# Patient Record
Sex: MALE | Race: WHITE | NOT HISPANIC OR LATINO | Employment: FULL TIME | URBAN - METROPOLITAN AREA
[De-identification: names, ages, dates, MRNs, and addresses within clinical notes are randomized per-mention and may not be internally consistent; named-entity substitution may affect disease eponyms.]

---

## 2017-01-04 ENCOUNTER — CLINICAL SUPPORT (OUTPATIENT)
Dept: REHABILITATION | Facility: HOSPITAL | Age: 57
End: 2017-01-04
Attending: ORTHOPAEDIC SURGERY
Payer: COMMERCIAL

## 2017-01-04 DIAGNOSIS — G25.9 FUNCTIONAL MOVEMENT DISORDER: ICD-10-CM

## 2017-01-04 PROCEDURE — 97530 THERAPEUTIC ACTIVITIES: CPT | Performed by: PHYSICAL THERAPIST

## 2017-01-04 NOTE — PROGRESS NOTES
"Visit 3/  Name: Nishant Page  [facility] Number: [id number]  Date of Treatment: 2017   Diagnosis:   Encounter Diagnosis   Name Primary?    Functional movement disorder    Diagnosis:   S/p L knee menisectomy/chondroplasty  Date of Onset:   2.5 month hx  Date of Surgery:   2016  Time in:  8:00  Time Out:  9:00  Total Treatment Time:  60'  Group Time: NA  Subjective:  Nishant reports continued improvement in his L knee condition, however, has been unable to return to higher level activity at this time including skiing, jogging, and basketball .  Patient reports their pain to be 0/10 on a 0-10 scale with 0 being no pain and 10 being the worst pain imaginable.  Objective  PO 28  Neg effusion L knee   Patient participated in dynamic functional therapeutic activities to improve functional performance for  60 minutes one on one with PT. Including:   U squat + 1 black sport cord 3x1' R/L  Lat hops + 2 black sport cord 3x:30 R/L  FW hops + 2 black sport cords 2x1'   BW "   "    CP x 10'   Written Home Exercises Provided:  Updated for walk/jog program with 180 heidi and mid foot strike  Pt demo good understanding of the education provided. Nishant demonstrated good return demonstration of activities.   Assessment:   Decreased movement patterning L knee/LE and decreased endurance which pt requires for ADLs and higher level activities   Good training effect in quad/hip/calf musculature, no c/o L knee pain, still limited in full function  Pt will continue to benefit from skilled PT intervention. Medical Necessity is demonstrated by:  Unable to participate fully in daily activities.  Patient is making good progress towards established goals.  New/Revised goals:   STG:  3-4 weeks (achieved)  Decrease effusion to Trace   Increase ROM 50-75%  Increase MMT 50-75%  LT-8 weeks   Decrease c/o pain with activity 100% (achieved)  Decrease effusion to 0 (achieved)  Restore 100% full ROM (achieved)  Restore 100% full MMT  Return to " 100% preinjury level of function (see functional limitations)  Plan:  Continue with established Plan of Care towards PT goals X 1 visit and DC to HEP if appropriate   Sports Test NV if appropriate.   Ricardo Rodriguez, PT  1/4/2017

## 2017-01-17 ENCOUNTER — CLINICAL SUPPORT (OUTPATIENT)
Dept: REHABILITATION | Facility: HOSPITAL | Age: 57
End: 2017-01-17
Attending: ORTHOPAEDIC SURGERY
Payer: COMMERCIAL

## 2017-01-17 DIAGNOSIS — G25.9 FUNCTIONAL MOVEMENT DISORDER: ICD-10-CM

## 2017-01-17 PROCEDURE — 97530 THERAPEUTIC ACTIVITIES: CPT | Performed by: PHYSICAL THERAPIST

## 2017-01-17 NOTE — PROGRESS NOTES
Visit 4/  Name: Nishant Page  [facility] Number: [id number]  Date of Treatment: 2017   Diagnosis:   Encounter Diagnosis   Name Primary?    Functional movement disorder    Diagnosis:   S/p L knee menisectomy/chondroplasty  Date of Onset:   2.5 month hx  Date of Surgery:   2016  Time in:  7:00  Time Out:  8:00  Total Treatment Time:  60'  Group Time: NA  Subjective:  Nishant reports that he has been doing some plyometric jumping activities at PT in [place] with minimal soreness medially in L knee, overall doing well, reports that he will be going skiing next week Patient reports their pain to be 0/10 on a 0-10 scale with 0 being no pain and 10 being the worst pain imaginable.  Objective  PO 41  Neg effusion L knee   Full ROM k' flex/ext  MMT 5/5 k' flex/ext  Patient participated in dynamic functional therapeutic activities to improve functional performance for  45 minutes Including:   Foam roll full body and stick upper/lower leg  Dynamic warm up  Build ups 2x4 50/75% x 10 yds  Fig 8 runs 2x3  T drill 2x3  Side to side drill tennis 3x:20  CP x 10'   Written Home Exercises Provided:  Pt to consider  Use of foam roll or stick,  brace if needed in future  Pt demo good understanding of the education provided. Nishant demonstrated good return demonstration of activities.   Assessment:   All short and long term goals achieved   STG:  3-4 weeks (achieved)  Decrease effusion to Trace   Increase ROM 50-75%  Increase MMT 50-75%  LT-8 weeks   Decrease c/o pain with activity 100% (achieved)  Decrease effusion to 0 (achieved)  Restore 100% full ROM (achieved)  Restore 100% full MMT (achieved)  Return to 100% preinjury level of function (see functional limitations) (achieved)  Plan:   DC to [facility] secondary to progress, pt to contact MD or PT should sxs reoccur, pt to consider  brace as needed if medial joint line sxs persist   Ricardo Rodriguez, PT  2017

## 2017-08-29 ENCOUNTER — OFFICE VISIT (OUTPATIENT)
Dept: SPORTS MEDICINE | Facility: CLINIC | Age: 57
End: 2017-08-29
Payer: COMMERCIAL

## 2017-08-29 VITALS
BODY MASS INDEX: 26.96 KG/M2 | HEIGHT: 69 IN | SYSTOLIC BLOOD PRESSURE: 123 MMHG | HEART RATE: 67 BPM | WEIGHT: 182 LBS | DIASTOLIC BLOOD PRESSURE: 80 MMHG

## 2017-08-29 DIAGNOSIS — M25.512 LEFT SHOULDER PAIN: ICD-10-CM

## 2017-08-29 DIAGNOSIS — R07.81 RIB PAIN ON LEFT SIDE: ICD-10-CM

## 2017-08-29 DIAGNOSIS — M25.532 LEFT WRIST PAIN: ICD-10-CM

## 2017-08-29 DIAGNOSIS — M25.531 RIGHT WRIST PAIN: Primary | ICD-10-CM

## 2017-08-29 PROCEDURE — 99214 OFFICE O/P EST MOD 30 MIN: CPT | Mod: S$GLB,,, | Performed by: ORTHOPAEDIC SURGERY

## 2017-08-29 PROCEDURE — 99999 PR PBB SHADOW E&M-EST. PATIENT-LVL III: CPT | Mod: PBBFAC,,, | Performed by: ORTHOPAEDIC SURGERY

## 2017-08-29 PROCEDURE — 3008F BODY MASS INDEX DOCD: CPT | Mod: S$GLB,,, | Performed by: ORTHOPAEDIC SURGERY
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
## 2021-04-22 ENCOUNTER — TELEPHONE (OUTPATIENT)
Dept: SPORTS MEDICINE | Facility: CLINIC | Age: 61
End: 2021-04-22

## 2021-04-23 ENCOUNTER — TELEPHONE (OUTPATIENT)
Dept: SPORTS MEDICINE | Facility: CLINIC | Age: 61
End: 2021-04-23